# Patient Record
Sex: MALE | Race: WHITE | URBAN - METROPOLITAN AREA
[De-identification: names, ages, dates, MRNs, and addresses within clinical notes are randomized per-mention and may not be internally consistent; named-entity substitution may affect disease eponyms.]

---

## 2019-06-07 LAB — HBA1C MFR BLD HPLC: 9.1 %

## 2019-08-22 ENCOUNTER — OFFICE VISIT (OUTPATIENT)
Dept: FAMILY MEDICINE CLINIC | Age: 49
End: 2019-08-22

## 2019-08-22 VITALS
OXYGEN SATURATION: 98 % | HEIGHT: 68 IN | WEIGHT: 264.4 LBS | HEART RATE: 82 BPM | RESPIRATION RATE: 16 BRPM | BODY MASS INDEX: 40.07 KG/M2 | DIASTOLIC BLOOD PRESSURE: 79 MMHG | SYSTOLIC BLOOD PRESSURE: 146 MMHG | TEMPERATURE: 98.3 F

## 2019-08-22 DIAGNOSIS — Z13.0 SCREENING FOR DEFICIENCY ANEMIA: ICD-10-CM

## 2019-08-22 DIAGNOSIS — Z12.11 SCREENING FOR COLON CANCER: ICD-10-CM

## 2019-08-22 DIAGNOSIS — Z00.00 ROUTINE MEDICAL EXAM: Primary | ICD-10-CM

## 2019-08-22 DIAGNOSIS — E11.00 TYPE 2 DIABETES MELLITUS WITH HYPEROSMOLARITY WITHOUT COMA, WITH LONG-TERM CURRENT USE OF INSULIN (HCC): ICD-10-CM

## 2019-08-22 DIAGNOSIS — Z13.220 SCREENING FOR HYPERLIPIDEMIA: ICD-10-CM

## 2019-08-22 DIAGNOSIS — I77.6 VASCULITIS (HCC): ICD-10-CM

## 2019-08-22 DIAGNOSIS — I83.93 ASYMPTOMATIC VARICOSE VEINS OF BOTH LOWER EXTREMITIES: ICD-10-CM

## 2019-08-22 DIAGNOSIS — Z79.4 TYPE 2 DIABETES MELLITUS WITH HYPEROSMOLARITY WITHOUT COMA, WITH LONG-TERM CURRENT USE OF INSULIN (HCC): ICD-10-CM

## 2019-08-22 DIAGNOSIS — Z12.5 SCREENING FOR MALIGNANT NEOPLASM OF PROSTATE: ICD-10-CM

## 2019-08-22 DIAGNOSIS — Z13.29 SCREENING FOR THYROID DISORDER: ICD-10-CM

## 2019-08-22 DIAGNOSIS — I86.8 SPIDER VARICOSE VEIN: ICD-10-CM

## 2019-08-22 PROBLEM — E66.01 OBESITY, MORBID (HCC): Status: ACTIVE | Noted: 2019-08-22

## 2019-08-22 LAB
HCT VFR BLD AUTO: 43.9 % (ref 39.3–51.6)
HGB BLD-MCNC: 14.7 G/DL (ref 13.1–17.2)
TSH SERPL DL<=0.005 MIU/L-ACNC: 1.93 MCU/ML (ref 0.27–4.2)

## 2019-08-22 RX ORDER — OMEPRAZOLE 20 MG/1
20 CAPSULE, DELAYED RELEASE ORAL DAILY
COMMUNITY

## 2019-08-22 RX ORDER — CHOLECALCIFEROL (VITAMIN D3) 125 MCG
CAPSULE ORAL
COMMUNITY

## 2019-08-22 RX ORDER — LISINOPRIL AND HYDROCHLOROTHIAZIDE 12.5; 2 MG/1; MG/1
TABLET ORAL
Refills: 0 | COMMUNITY
Start: 2019-07-11

## 2019-08-22 RX ORDER — ATORVASTATIN CALCIUM 20 MG/1
20 TABLET, FILM COATED ORAL DAILY
Refills: 0 | COMMUNITY
Start: 2019-07-01

## 2019-08-22 RX ORDER — INSULIN LISPRO 100 [IU]/ML
INJECTION, SOLUTION INTRAVENOUS; SUBCUTANEOUS
Refills: 0 | COMMUNITY
Start: 2019-07-19

## 2019-08-22 RX ORDER — BISMUTH SUBSALICYLATE 262 MG
1 TABLET,CHEWABLE ORAL DAILY
COMMUNITY

## 2019-08-22 NOTE — PROGRESS NOTES
Pt is here to establish care  Well Male Exam    1. Have you been to the ER, urgent care clinic since your last visit? Hospitalized since your last visit? No    2. Have you seen or consulted any other health care providers outside of the [address] [place] [address] since your last visit? Include any pap smears or colon screening.  Yes Sarita Johnson NP Endocrinology Diabetes 5/19

## 2019-08-22 NOTE — PROGRESS NOTES
Subjective:     Meli Wilkes is a 51 y.o. male presenting for annual exam and complete physical.    Pt sees endocrine for his diabetes. Patient Active Problem List    Diagnosis Date Noted    Obesity, morbid ([facility] [address] 08/22/2019     Current Outpatient Medications   Medication Sig Dispense Refill    HUMALOG U-100 INSULIN 100 unit/mL injection Insulin pump  0    lisinopril-hydroCHLOROthiazide (PRINZIDE, ZESTORETIC) 20-12.5 mg per tablet   0    atorvastatin (LIPITOR) 20 mg tablet Take 20 mg by mouth daily. 0    omeprazole (PRILOSEC) 20 mg capsule Take 20 mg by mouth daily.  cholecalciferol, vitamin D3, (VITAMIN D3) 2,000 unit tab Take  by mouth.  multivitamin (ONE A DAY) tablet Take 1 Tab by mouth daily. No Known Allergies  Past Medical History:   Diagnosis Date    Diabetes ([facility]     Type 1    Hypercholesterolemia     Hypertension      Past Surgical History:   Procedure Laterality Date    HX HEENT      L eye surgery laser, scarr tissue removed     Family History   Problem Relation Age of Onset    Heart Attack Mother     Thyroid Disease Mother     No Known Problems Father      Social History     Tobacco Use    Smoking status: Never Smoker    Smokeless tobacco: Never Used   Substance Use Topics    Alcohol use: Yes     Alcohol/week: 4.0 standard drinks     Types: 4 Shots of liquor per week             Review of Systems  Constitutional: negative  Eyes: negative  Ears, nose, mouth, throat, and face: negative  Respiratory: negative  Cardiovascular: negative  Gastrointestinal: negative  Genitourinary:negative  Integument/breast: negative  Hematologic/lymphatic: negative  Musculoskeletal:multiple joints pain  Neurological: negative  Behavioral/Psych: negative  Endocrine: diabetic  Allergic/Immunologic: negative  When patient is around someone who is smoking, he gets a headache and it can get to a point of nause.     Objective:     Visit Vitals  /79 (BP 1 Location: Left arm)   Pulse 82 Temp 98.3 °F (36.8 °C) (Oral)   Resp 16   Ht 5' 8.11\" (1.73 m)   Wt 264 lb 6.4 oz (119.9 kg)   SpO2 98%   BMI 40.07 kg/m²     Physical exam:   General appearance - alert, well appearing, and in no distress  Mental status - alert, oriented to person, place, and time  Eyes - pupils equal and reactive, extraocular eye movements intact  Ears - bilateral TM's and external ear canals normal  Nose - normal and patent, no erythema, discharge or polyps  Mouth - mucous membranes moist, pharynx normal without lesions  Neck - supple, no significant adenopathy  Lymphatics - no palpable lymphadenopathy, no hepatosplenomegaly  Chest - clear to auscultation, no wheezes, rales or rhonchi, symmetric air entry  Heart - normal rate, regular rhythm, normal S1, S2, no murmurs, rubs, clicks or gallops  Abdomen - soft, nontender, nondistended, no masses or organomegaly  Back exam - full range of motion, no tenderness, palpable spasm or pain on motion  Neurological - alert, oriented, normal speech, no focal findings or movement disorder noted  Musculoskeletal - no joint tenderness, deformity or swelling  Extremities - peripheral pulses normal, no pedal edema, no clubbing or cyanosis, varicose veins noted, venous stasis dermatitis noted  Skin - normal coloration and turgor, no rashes, no suspicious skin lesions noted     Assessment/Plan:       lose weight, increase physical activity, routine labs ordered. ICD-10-CM ICD-9-CM    1. Routine medical exam Z00.00 V70.0    2. Screening for malignant neoplasm of prostate Z12.5 V76.44 PSA SCREENING (SCREENING)   3. Screening for thyroid disorder Z13.29 V77.0 TSH 3RD GENERATION   4. Spider varicose vein I86.8 454.9 REFERRAL TO VASCULAR CENTER   5. Vasculitis (HCC) I77.6 447.6 REFERRAL TO VASCULAR CENTER   6. Asymptomatic varicose veins of both lower extremities I83.93 454.9 REFERRAL TO VASCULAR CENTER   7.  Type 2 diabetes mellitus with hyperosmolarity without coma, with long-term current use of insulin (HCC) E11.00 250.20 REFERRAL TO OPHTHALMOLOGY    Z79.4 V58.67    8. Screening for colon cancer Z12.11 V76.51 REFERRAL TO GASTROENTEROLOGY   9. Screening for hyperlipidemia Z13.220 V77.91 LIPID PANEL   10. Screening for deficiency anemia Z13.0 V78.1 HGB & HCT   . PLAN:  We discussed screening recommendations. Pt is in the process of adopting a 1year old. I have discussed the diagnosis with the patient and the intended plan as seen in the above orders. The patient has received an after-visit summary and questions were answered concerning future plans. I have discussed medication side effects and warnings with the patient as well. Patient will call for further questions. Follow-up and Dispositions    · Return in about 1 year (around 8/22/2020) for [facility].

## 2019-08-23 LAB
CHOLEST SERPL-MCNC: 207 MG/DL (ref 110–200)
HDLC SERPL-MCNC: 4 MG/DL (ref 0–5)
HDLC SERPL-MCNC: 52 MG/DL (ref 40–59)
LDLC SERPL CALC-MCNC: 123 MG/DL (ref 50–99)
PSA SERPL-MCNC: 1.31 NG/ML
TRIGL SERPL-MCNC: 159 MG/DL (ref 40–149)
VLDLC SERPL CALC-MCNC: 32 MG/DL (ref 8–30)

## 2020-12-21 ENCOUNTER — TELEPHONE (OUTPATIENT)
Dept: FAMILY MEDICINE CLINIC | Age: 50
End: 2020-12-21

## 2020-12-21 NOTE — TELEPHONE ENCOUNTER
----- Message from Pattie Ramirez sent at 12/16/2020  3:48 PM EST -----  Regarding: covid test  Pt needs a covid order for his job

## 2020-12-23 ENCOUNTER — VIRTUAL VISIT (OUTPATIENT)
Dept: FAMILY MEDICINE CLINIC | Age: 50
End: 2020-12-23
Payer: COMMERCIAL

## 2020-12-23 DIAGNOSIS — U07.1 COVID-19: Primary | ICD-10-CM

## 2020-12-23 PROCEDURE — 99213 OFFICE O/P EST LOW 20 MIN: CPT | Performed by: INTERNAL MEDICINE

## 2020-12-23 RX ORDER — CODEINE PHOSPHATE AND GUAIFENESIN 10; 100 MG/5ML; MG/5ML
5 SOLUTION ORAL
Qty: 120 ML | Refills: 1 | Status: SHIPPED | OUTPATIENT
Start: 2020-12-23 | End: 2021-01-06

## 2020-12-23 NOTE — PROGRESS NOTES
Ronald Salgado is a 45 y.o. male who was seen by synchronous (real-time) audio-video technology on 12/23/2020 for Concern For COVID-19 (Coronavirus)        Assessment & Plan:   Diagnoses and all orders for this visit:    1. COVID-19        I spent at least 15 minutes on this visit with this established patient. Covid 19  Self quarantine for 14 days from onset of sxs   Supportive care with fluids, rest and tylenol  pam AC 1-2 tsp q 6 hrs prn cough  To ER if worsens (he is higher risk for complications in light of diabetes)  F/U if unimproved 10 days  OV 3 mos or prn  I have explained plan to patient and the patient verbalizes understanding      712  Subjective:   5 days of head congestion, facial discomfort, NP cough, fever and myalgias  No dyspnea, CP, or N  Tested + for COVID 12/17/2020 (multiple work associates also tested + at the same time)      Prior to Admission medications    Medication Sig Start Date End Date Taking? Authorizing Provider   HUMALOG U-100 INSULIN 100 unit/mL injection Insulin pump 7/19/19   Provider, Historical   lisinopril-hydroCHLOROthiazide (PRINZIDE, ZESTORETIC) 20-12.5 mg per tablet  7/11/19   Provider, Historical   atorvastatin (LIPITOR) 20 mg tablet Take 20 mg by mouth daily. 7/1/19   Provider, Historical   omeprazole (PRILOSEC) 20 mg capsule Take 20 mg by mouth daily. Provider, Historical   cholecalciferol, vitamin D3, (VITAMIN D3) 2,000 unit tab Take  by mouth. Provider, Historical   multivitamin (ONE A DAY) tablet Take 1 Tab by mouth daily. Provider, Historical     Current Outpatient Medications   Medication Sig Dispense Refill    HUMALOG U-100 INSULIN 100 unit/mL injection Insulin pump  0    lisinopril-hydroCHLOROthiazide (PRINZIDE, ZESTORETIC) 20-12.5 mg per tablet   0    atorvastatin (LIPITOR) 20 mg tablet Take 20 mg by mouth daily. 0    omeprazole (PRILOSEC) 20 mg capsule Take 20 mg by mouth daily.       cholecalciferol, vitamin D3, (VITAMIN D3) 2,000 unit tab Take  by mouth.  multivitamin (ONE A DAY) tablet Take 1 Tab by mouth daily. No Known Allergies  Past Medical History:   Diagnosis Date    Diabetes ([address]     Type 1    Hypercholesterolemia     Hypertension      Past Surgical History:   Procedure Laterality Date    HX HEENT      L eye surgery laser, scarr tissue removed       ROS per HPI    Objective:   No flowsheet data found. General: alert, cooperative, no distress   Mental  status: normal mood, behavior, speech, dress, motor activity, and thought processes, able to follow commands   HENT: NCAT   Neck: no visualized mass   Resp: no respiratory distress   Neuro: no gross deficits   Skin: no discoloration or lesions of concern on visible areas   Psychiatric: normal affect, consistent with stated mood, no evidence of hallucinations     Additional exam findings: We discussed the expected course, resolution and complications of the diagnosis(es) in detail. Medication risks, benefits, costs, interactions, and alternatives were discussed as indicated. I advised him to contact the office if his condition worsens, changes or fails to improve as anticipated. He expressed understanding with the diagnosis(es) and plan. Rubia Pride, who was evaluated through a patient-initiated, synchronous (real-time) audio-video encounter, and/or his healthcare decision maker, is aware that it is a billable service, with coverage as determined by his insurance carrier. He provided verbal consent to proceed: Yes, and patient identification was verified. It was conducted pursuant to the emergency declaration under the [facility], [address] [place] [address] authority and the [employer] General Act. A caregiver was present when appropriate. Ability to conduct physical exam was limited. I was at home. The patient was at home.       Jase Fonseca MD

## 2021-04-08 ENCOUNTER — VIRTUAL VISIT (OUTPATIENT)
Dept: FAMILY MEDICINE CLINIC | Age: 51
End: 2021-04-08
Payer: COMMERCIAL

## 2021-04-08 DIAGNOSIS — K04.7 DENTAL INFECTION: Primary | ICD-10-CM

## 2021-04-08 PROCEDURE — 99442 PR PHYS/QHP TELEPHONE EVALUATION 11-20 MIN: CPT | Performed by: INTERNAL MEDICINE

## 2021-04-08 NOTE — PROGRESS NOTES
Robyne Snellen is a 45 y.o. male, evaluated via audio-only technology on 4/8/2021 for Dental Pain  . Assessment & Plan:   Diagnoses and all orders for this visit:    1. Dental infection    dental infection  To dentist ASAP as surgical procedure frequently needed  Explained to him that antibiotic rx is not necessarily curative  To ER if worsens  OV 3 mos or prn  I have explained plan to patient and the patient verbalizes understanding      12  Subjective:   Given augmentin/motrin at [facility] 72 hrs ago for dental infection (R) 1st molar  Pain/swelling have not worsened but he reports no significant improvenment  No fever, skin redness, trouble swallowing    Prior to Admission medications    Medication Sig Start Date End Date Taking? Authorizing Provider   HUMALOG U-100 INSULIN 100 unit/mL injection Insulin pump 7/19/19   Provider, Historical   lisinopril-hydroCHLOROthiazide (PRINZIDE, ZESTORETIC) 20-12.5 mg per tablet  7/11/19   Provider, Historical   atorvastatin (LIPITOR) 20 mg tablet Take 20 mg by mouth daily. 7/1/19   Provider, Historical   omeprazole (PRILOSEC) 20 mg capsule Take 20 mg by mouth daily. Provider, Historical   cholecalciferol, vitamin D3, (VITAMIN D3) 2,000 unit tab Take  by mouth. Provider, Historical   multivitamin (ONE A DAY) tablet Take 1 Tab by mouth daily. Provider, Historical     Current Outpatient Medications   Medication Sig Dispense Refill    HUMALOG U-100 INSULIN 100 unit/mL injection Insulin pump  0    lisinopril-hydroCHLOROthiazide (PRINZIDE, ZESTORETIC) 20-12.5 mg per tablet   0    atorvastatin (LIPITOR) 20 mg tablet Take 20 mg by mouth daily. 0    omeprazole (PRILOSEC) 20 mg capsule Take 20 mg by mouth daily.  cholecalciferol, vitamin D3, (VITAMIN D3) 2,000 unit tab Take  by mouth.  multivitamin (ONE A DAY) tablet Take 1 Tab by mouth daily.        No Known Allergies  Past Medical History:   Diagnosis Date    Diabetes ([facility] [address]     Type 1    Hypercholesterolemia  Hypertension      Past Surgical History:   Procedure Laterality Date    HX HEENT      L eye surgery laser, scarr tissue removed       ROS per HPI    No flowsheet data found. Isatu Arroyo, who was evaluated through a patient-initiated, synchronous (real-time) audio only encounter, and/or her healthcare decision maker, is aware that it is a billable service, with coverage as determined by his insurance carrier. He provided verbal consent to proceed: Yes. He has not had a related appointment within my department in the past 7 days or scheduled within the next 24 hours.       Total Time: 11 min    Windy Hanna MD

## 2021-04-08 NOTE — PROGRESS NOTES
Yissel Morejon is a 45 y.o. male who was seen by synchronous (real-time) audio-video technology on 4/8/2021 for No chief complaint on file. Assessment & Plan:  
{There are no diagnoses linked to this encounter. (Refresh or delete this SmartLink)} [address] Subjective:  
 
 
Prior to Admission medications Medication Sig Start Date End Date Taking? Authorizing Provider HUMALOG U-100 INSULIN 100 unit/mL injection Insulin pump 7/19/19   Provider, Historical  
lisinopril-hydroCHLOROthiazide (PRINZIDE, ZESTORETIC) 20-12.5 mg per tablet  7/11/19   Provider, Historical  
atorvastatin (LIPITOR) 20 mg tablet Take 20 mg by mouth daily. 7/1/19   Provider, Historical  
omeprazole (PRILOSEC) 20 mg capsule Take 20 mg by mouth daily. Provider, Historical  
cholecalciferol, vitamin D3, (VITAMIN D3) 2,000 unit tab Take  by mouth. Provider, Historical  
multivitamin (ONE A DAY) tablet Take 1 Tab by mouth daily. Provider, Historical  
 
Current Outpatient Medications Medication Sig Dispense Refill  HUMALOG U-100 INSULIN 100 unit/mL injection Insulin pump  0  
 lisinopril-hydroCHLOROthiazide (PRINZIDE, ZESTORETIC) 20-12.5 mg per tablet   0  
 atorvastatin (LIPITOR) 20 mg tablet Take 20 mg by mouth daily. 0  
 omeprazole (PRILOSEC) 20 mg capsule Take 20 mg by mouth daily.  cholecalciferol, vitamin D3, (VITAMIN D3) 2,000 unit tab Take  by mouth.  multivitamin (ONE A DAY) tablet Take 1 Tab by mouth daily. No Known Allergies Past Medical History:  
Diagnosis Date  Diabetes ([facility] [address] Type 1  
 Hypercholesterolemia  Hypertension Past Surgical History:  
Procedure Laterality Date  HX HEENT    
 L eye surgery laser, scarr tissue removed ROS per HPI Objective: No flowsheet data found. General: alert, cooperative, no distress Mental  status: normal mood, behavior, speech, dress, motor activity, and thought processes, able to follow commands HENT: NCAT Neck: no visualized mass Resp: no respiratory distress Neuro: no gross deficits Skin: no discoloration or lesions of concern on visible areas Psychiatric: normal affect, consistent with stated mood, no evidence of hallucinations Additional exam findings: no We discussed the expected course, resolution and complications of the diagnosis(es) in detail. Medication risks, benefits, costs, interactions, and alternatives were discussed as indicated. I advised him to contact the office if his condition worsens, changes or fails to improve as anticipated. He expressed understanding with the diagnosis(es) and plan. Js [employer], was evaluated through a synchronous (real-time) audio-video encounter. The patient (or guardian if applicable) is aware that this is a billable service. Verbal consent to proceed has been obtained within the past 12 months. The visit was conducted pursuant to the emergency declaration under the [address] [place] [address], [address] [place] [address] authority and the [employer] General Act. Patient identification was verified, and a caregiver was present when appropriate. The patient was located in a state where the provider was credentialed to provide care.  
 
Brad Christian MD
